# Patient Record
Sex: MALE
[De-identification: names, ages, dates, MRNs, and addresses within clinical notes are randomized per-mention and may not be internally consistent; named-entity substitution may affect disease eponyms.]

---

## 2022-08-15 PROBLEM — Z00.00 ENCOUNTER FOR PREVENTIVE HEALTH EXAMINATION: Status: ACTIVE | Noted: 2022-08-15

## 2022-08-16 ENCOUNTER — APPOINTMENT (OUTPATIENT)
Dept: ORTHOPEDIC SURGERY | Facility: CLINIC | Age: 42
End: 2022-08-16

## 2022-08-17 ENCOUNTER — APPOINTMENT (OUTPATIENT)
Dept: PODIATRY | Facility: CLINIC | Age: 42
End: 2022-08-17

## 2022-08-17 VITALS — HEIGHT: 73 IN | WEIGHT: 310 LBS | BODY MASS INDEX: 41.08 KG/M2

## 2022-08-17 DIAGNOSIS — S90.32XA CONTUSION OF LEFT FOOT, INITIAL ENCOUNTER: ICD-10-CM

## 2022-08-17 DIAGNOSIS — S82.52XA DISPLACED FRACTURE OF MEDIAL MALLEOLUS OF LEFT TIBIA, INITIAL ENCOUNTER FOR CLOSED FRACTURE: ICD-10-CM

## 2022-08-17 DIAGNOSIS — I63.9 CEREBRAL INFARCTION, UNSPECIFIED: ICD-10-CM

## 2022-08-17 DIAGNOSIS — Z86.59 PERSONAL HISTORY OF OTHER MENTAL AND BEHAVIORAL DISORDERS: ICD-10-CM

## 2022-08-17 DIAGNOSIS — S97.82XA CRUSHING INJURY OF LEFT FOOT, INITIAL ENCOUNTER: ICD-10-CM

## 2022-08-17 DIAGNOSIS — S90.122A CONTUSION OF LEFT FOOT, INITIAL ENCOUNTER: ICD-10-CM

## 2022-08-17 DIAGNOSIS — Z86.79 PERSONAL HISTORY OF OTHER DISEASES OF THE CIRCULATORY SYSTEM: ICD-10-CM

## 2022-08-17 PROCEDURE — 99203 OFFICE O/P NEW LOW 30 MIN: CPT

## 2022-08-17 PROCEDURE — L4361: CPT | Mod: LT

## 2022-08-17 NOTE — REASON FOR VISIT
[FreeTextEntry2] : Patient is here for left foot injury while working at home an object came down to his foot DOI 8/13/2022, went to urgent  care on 8/14/2022

## 2022-08-17 NOTE — PHYSICAL EXAM
[Left] : left foot and ankle [Severe] : severe swelling of medial foot [3rd] : 3rd [4th] : 4th [5th] : 5th [NL 30)] : inversion 30 degrees [NL (40)] : MTP joint DF 40 degrees [NL (20)] : MTP joint PF 20 degrees [5___] : [facility] 5[unfilled]/5 [2+] : posterior tibialis pulse: 2+ [Normal] : saphenous nerve sensation normal [Medial malleolus fracture] : Medial malleolus fracture [Outside films reviewed] : Outside films reviewed [] : varicosities are not warm and well perfused [de-identified] : pain with walking.  [de-identified] : NO FRACTURES SEEN

## 2022-08-17 NOTE — ASSESSMENT
[FreeTextEntry1] : DISPENSED CAM WALKER.\par DR KERA WARREN IS HIS PHONE NUMBER TO REDUCE PAIN. \par ICE AND ELEVATE LEG TO REDUCE PAIN.\par

## 2022-09-07 ENCOUNTER — APPOINTMENT (OUTPATIENT)
Dept: PODIATRY | Facility: CLINIC | Age: 42
End: 2022-09-07